# Patient Record
Sex: FEMALE | Race: WHITE | NOT HISPANIC OR LATINO
[De-identification: names, ages, dates, MRNs, and addresses within clinical notes are randomized per-mention and may not be internally consistent; named-entity substitution may affect disease eponyms.]

---

## 2017-01-16 ENCOUNTER — FORM ENCOUNTER (OUTPATIENT)
Age: 47
End: 2017-01-16

## 2017-01-17 ENCOUNTER — OUTPATIENT (OUTPATIENT)
Dept: OUTPATIENT SERVICES | Facility: HOSPITAL | Age: 47
LOS: 1 days | End: 2017-01-17
Payer: MEDICAID

## 2017-01-17 DIAGNOSIS — Z98.89 OTHER SPECIFIED POSTPROCEDURAL STATES: Chronic | ICD-10-CM

## 2017-01-17 PROCEDURE — 72040 X-RAY EXAM NECK SPINE 2-3 VW: CPT

## 2017-01-17 PROCEDURE — 72040 X-RAY EXAM NECK SPINE 2-3 VW: CPT | Mod: 26

## 2019-03-18 ENCOUNTER — APPOINTMENT (OUTPATIENT)
Dept: PHYSICAL MEDICINE AND REHAB | Facility: CLINIC | Age: 49
End: 2019-03-18
Payer: MEDICARE

## 2019-03-18 VITALS — BODY MASS INDEX: 25.01 KG/M2 | WEIGHT: 165 LBS | HEIGHT: 68 IN

## 2019-03-18 PROCEDURE — 20552 NJX 1/MLT TRIGGER POINT 1/2: CPT

## 2019-03-18 PROCEDURE — 99213 OFFICE O/P EST LOW 20 MIN: CPT | Mod: 25

## 2019-03-18 RX ORDER — LAMOTRIGINE 200 MG/1
200 TABLET ORAL
Refills: 0 | Status: ACTIVE | COMMUNITY

## 2019-03-18 RX ORDER — NORETHINDRONE ACETATE AND ETHINYL ESTRADIOL 1.5; 3 MG/1; UG/1
TABLET ORAL
Refills: 0 | Status: ACTIVE | COMMUNITY

## 2019-03-19 NOTE — PROCEDURE
[de-identified] : After informed consent, site was cleaned with alcohol.  The right cervical paraspinals, bilateral trapezius, and bilateral L5 paraspinals were injected with 0.5 ml of 1% Lidocaine in multiple areas of the muscle. Bandage was applied and She left in good condition.\par

## 2019-03-19 NOTE — HISTORY OF PRESENT ILLNESS
[FreeTextEntry1] : Location: right \par Quality: sharp; dull \par Severity: moderate \par Duration: few years \par Timing: recurrent \par Aggravating Factors: lifting \par Alleviating Factors: rest; trigger point injections, acupuncture \par Associated Symptoms: no weakness; no weight loss; no fever; no chills; no change in bowel/bladder habits; no swelling; no redness; no warmth; no ecchymosis; numbness/tingling; radiation down leg (right) \par Prior Studies: MRI\par \par Neck also hurting for years. Pain with movement and working. Numbness in hands even after surgery. RF by Dr. Marr helped.

## 2019-03-19 NOTE — PHYSICAL EXAM
[FreeTextEntry1] : 48-year-old female.\par \par Constitutional: General Appearance: healthy-appearing, NAD, and normal body habitus.\par \par Cardiovascular System: Edema Right: none. Edema Left: none. Varicosities Right: no varicosities. Varicosities Left: no varicosities.\par \par Cervical Spine: Inspection: alignment normal and no muscle atrophy. Soft Tissue Palpation on the Right: TENDERNESS of the paracervicals, the trapezius, rhomboid, no TTP of the levator scapulae. Soft Tissue Palpation on the Left: no tenderness of the paracervicals; TTP  trapezius, rhomboid. Bony Palpation: no tenderness of the occipital protuberance, the transverse process, or the spinous process. Active Range of Motion: rotation to the left (70 deg.) and the right (70 deg.); pain elicited by motion; and flexion normal, extension normal, and no crepitus.\par \par Motor Strength: C5 on the Right: abduction deltoid 5/5. C5 on the Left: abduction deltoid 5/5. C6 on the Right: flexion biceps 5/5. C6 on the Left: flexion biceps 5/5. C7 on the Right: extension triceps 5/5. C7 on the Left: extension triceps 5/5. C8 on the Right: flexion fingers 5/5. C8 on the Left: flexion fingers 5/5. T1 on the Right: abduction fingers 5/5. T1 on the Left: abduction fingers 5/5.\par \par Neurological System: Biceps Reflex Right: absent (0). Biceps Reflex Left: absent (0). Triceps Reflex Right: absent (0). Triceps Reflex Left: absent (0). Sensation on the Right: C5 normal, T1 normal, L2 normal, L3 normal, L4 normal, L5 normal, and S1 normal; decreased in 1-3rd digits bilat. Sensation on the Left: T1 normal, T2 normal, L2 normal, L3 normal, L4 normal, L5 normal, and S1 normal. Ankle Reflex Right: diminished (1). Ankle Reflex Left: diminished (1). Knee Reflex Right: diminished (1). Knee Reflex Left: diminished (1). Special Tests on the Right: seated straight leg raising test negative. Special Tests on the Left: seated straight leg raising test negative.\par \par Skin: Head and Neck: normal. Right Upper Extremity: normal. Left Upper Extremity: normal. Lumbosacral Spine: normal skin. Lower extremity, left: normal. Lower extremity, right: normal.\par \par Psychiatric: Orientation: oriented to time, place, and person. Mood and Affect: normal mood and affect and active and alert.\par \par Gait and Station: Appearance: normal gait, no limp, and ambulating with no assistive devices.\par \par Lumbar Spine: Inspection: no induration, ecchymosis, or swelling and normal alignment. Bony Palpation of the Lumbar Spine: no tenderness of the sacrum. Bony Palpation of the Right Hip: no tenderness of the iliac crest, the PSIS, or the SI joint and no tenderness on the greater trochanter. Bony Palpation of the Left Hip: no tenderness of the iliac crest, the PSIS, the SI joint, or the greater trochanter. Soft Tissue Palpation on the Right: no tenderness of the gluteus reinaldo, or the gluteus medius; TENDERNESS of the paraspinal region at L 5. Soft Tissue Palpation on the Left: no tenderness of the gluteus reinaldo, or the gluteus medius; TENDERNESS of the paraspinal region at L 5. Active Range of Motion: flexion (50 deg.) and pain with motion and extension normal.\par \par Motor Strength: L1 Motor Strength on the Right: hip flexion iliopsoas 5/5. L1 Motor Strength on the Left: hip flexion iliopsoas 5/5. L2-L4 Motor Strength on the Right: knee extension quadriceps 5/5. L2-L4 Motor Strength on the Left: knee extension quadriceps 5/5. L5 Motor Strength on the Right: ankle dorsiflexion tibialis anterior 5/5. L5 Motor Strength on the Left: ankle dorsiflexion tibialis anterior 5/5. S1 Motor Strength on the Right: plantar flexion gastrocnemius 5/5. S1 Motor Strength on the Left: plantar flexion gastrocnemius 5/5.

## 2019-03-26 ENCOUNTER — APPOINTMENT (OUTPATIENT)
Dept: ORTHOPEDIC SURGERY | Facility: CLINIC | Age: 49
End: 2019-03-26
Payer: MEDICARE

## 2019-03-26 PROCEDURE — 99213 OFFICE O/P EST LOW 20 MIN: CPT | Mod: 25

## 2019-03-26 PROCEDURE — 73562 X-RAY EXAM OF KNEE 3: CPT | Mod: RT

## 2019-03-26 PROCEDURE — 20610 DRAIN/INJ JOINT/BURSA W/O US: CPT | Mod: RT

## 2019-03-26 NOTE — PHYSICAL EXAM
[Normal Touch] : sensation intact for touch [Normal] : Oriented to person, place, and time, insight and judgement were intact and the affect was normal [de-identified] : On exam the right knee there is a moderate effusion there is no warmth. There is no skin abnormality. There is tenderness to the medial and lateral patellar facets medial lateral joint line as well as the medial lateral femoral condyle. There is no tenderness to the fibular head. Range of motion actively is 0-110° with pain at endrange of flexion. There is passive range of motion 0-115° with pain at endrange of flexion. [de-identified] : X-ray of right knee. There is no fracture. There is diffuse moderate medial lateral and patellofemoral arthritis.

## 2019-03-26 NOTE — HISTORY OF PRESENT ILLNESS
[de-identified] : PATIENT WAS DOING MARKEDLY BETTER SINCE LAST VISIT. PATIENT HAD FALL AT HOME, FOLLOWED BY PAIN AND EXTREME SWELLING. CANNOT STRAIGHTEN LEG, STAND, WEIGHT BARE, OR WALK. PAIN ON OUTER SIDE AND BACK OF KNEE & BURNING SENSATION. FALL WAS 3 DAYS AGO, PATIENT HAS BEEN RESTING AND ICING LEG EVER SINCE.

## 2019-03-26 NOTE — PROCEDURE
[FreeTextEntry1] : Patient has demonstrated limited relief from NSAIDS, rest, exercises / PT, and after discussion of the risks and benefits, the patient has elected to proceed with a corticosteroid injection into the RIGHT knee(s) via an Anterolateral site.\par Confirmed that the patient does not have history of prior adverse reactions, active, infections, or relevant allergies.   There was no erythema or warmth, and the skin was clear.  The skin was sterilized with alcohol and via sterile technique, the knee was injected 3 cc of 1% xylocaine with 5 mg Kenalog.  The injection was completed without complication and a bandage was applied.  The patient tolerated the procedure well and was given post-injection instructions.\par Apiration of knee performed with 20 cc of serosanginous fluid

## 2019-04-23 ENCOUNTER — OTHER (OUTPATIENT)
Age: 49
End: 2019-04-23

## 2019-04-26 ENCOUNTER — APPOINTMENT (OUTPATIENT)
Dept: PHYSICAL MEDICINE AND REHAB | Facility: CLINIC | Age: 49
End: 2019-04-26
Payer: MEDICARE

## 2019-04-26 VITALS — WEIGHT: 165 LBS | HEIGHT: 68 IN | BODY MASS INDEX: 25.01 KG/M2

## 2019-04-26 DIAGNOSIS — M50.20 OTHER CERVICAL DISC DISPLACEMENT, UNSPECIFIED CERVICAL REGION: ICD-10-CM

## 2019-04-26 DIAGNOSIS — M54.12 RADICULOPATHY, CERVICAL REGION: ICD-10-CM

## 2019-04-26 DIAGNOSIS — M51.26 OTHER INTERVERTEBRAL DISC DISPLACEMENT, LUMBAR REGION: ICD-10-CM

## 2019-04-26 DIAGNOSIS — M79.18 MYALGIA, OTHER SITE: ICD-10-CM

## 2019-04-26 DIAGNOSIS — M48.02 SPINAL STENOSIS, CERVICAL REGION: ICD-10-CM

## 2019-04-26 PROCEDURE — 99213 OFFICE O/P EST LOW 20 MIN: CPT | Mod: 25

## 2019-04-26 PROCEDURE — 20553 NJX 1/MLT TRIGGER POINTS 3/>: CPT

## 2019-04-26 RX ORDER — TRAMADOL HYDROCHLORIDE 50 MG/1
50 TABLET, COATED ORAL
Refills: 0 | Status: DISCONTINUED | COMMUNITY
End: 2019-04-26

## 2019-04-26 NOTE — PHYSICAL EXAM
[FreeTextEntry1] : KIN is a 48 year female \par Constitutional: healthy appearing, NAD, and overweight\par \par LUMBAR\par ROM: flexion to 30 deg, ext to 5 deg, pain with obliq ext\par \par Gait: normal\par \par Inspection: no erythema, warmth\par Spine: no TTP in spinous process, SI joint, sacrum\par Bony palpation: no TTP in GT\par \par Soft tissue palpation hip: no TTP in gluteus reinaldo, medius\par Soft tissue palpation of spine:  TTP in left lumbar paraspinals\par \par 5/5 bilateral HF, KE, DF, PF \par sensation intact in bilat LE\par reflexes: knee and ankle \par \par Special tests: neg seated SLR\par \par \par NECK\par ROM: flexion to 40, ext to 40, rotation to 70 deg bilat\par \par Inspection: no erythema, warmth\par Spine: no TTP in spinous process\par Soft tissue palpation: no TTP in cervical paraspinals, rhomboids, TTP in  trapezius bilat\par \par 5/5 bilateral elb flex/ext, WE, finger abd/flex\par sensation intact in bilat UE\par reflexes:  biceps and triceps\par \par Special tests: neg Spurling, Bolton\par \par

## 2019-04-26 NOTE — HISTORY OF PRESENT ILLNESS
[FreeTextEntry1] : Location: back\par Quality: sharp; dull \par Severity: moderate \par Duration: few years \par Timing: recurrent \par Aggravating Factors: lifting \par Alleviating Factors: rest; trigger point injections, acupuncture, RF ablation\par Associated Symptoms: no weakness; no weight loss; no fever; no chills; no change in bowel/bladder habits; no swelling; no redness; no warmth; no ecchymosis; numbness/tingling; radiation down leg (right) \par Prior Studies: MRI\par \par Neck also hurting for years. Pain with movement and working. Numbness in hands even after surgery. RF by Dr. Marr helped.

## 2019-04-26 NOTE — ASSESSMENT
[FreeTextEntry1] : No swimming or soaking for 1 day.  MRI is medically necessary to further evaluation the condition.  \par \par She  has recently tried the following treatments:\par Activity modification      +\par Ice/Compression           +\par Nsaids                           +\par Home exercise             +\par \par

## 2019-04-26 NOTE — PROCEDURE
[de-identified] : After informed consent,she elected to proceed with a trigger point injection into the left lumbar and thoracic paraspinals, and bilateral trapezius. I confirmed no prior adverse reactions, no active infections, and no relevant allergies. \par  \par The skin was prepped in the usual sterile manner. The muscles were pinched and elevated from the chest wall to avoid puncturing the lung. The sites were injected with local anesthetic followed by local needling. The injection was completed without complication and a bandage was applied. She tolerated the procedure well and was given post-injection instructions. \par  \par Cold Tx x 48 hours, analgesics prn. Medications: 0.5 ml of 1% Lidocaine per site\par \par \par

## 2019-05-03 ENCOUNTER — APPOINTMENT (OUTPATIENT)
Dept: PHYSICAL MEDICINE AND REHAB | Facility: CLINIC | Age: 49
End: 2019-05-03

## 2019-06-03 ENCOUNTER — OTHER (OUTPATIENT)
Age: 49
End: 2019-06-03

## 2019-06-03 RX ORDER — TRAMADOL HYDROCHLORIDE 50 MG/1
50 TABLET, COATED ORAL 4 TIMES DAILY
Qty: 30 | Refills: 0 | Status: ACTIVE | COMMUNITY
Start: 2019-06-03 | End: 1900-01-01

## 2019-06-28 ENCOUNTER — OTHER (OUTPATIENT)
Age: 49
End: 2019-06-28

## 2019-06-28 RX ORDER — TRAMADOL HYDROCHLORIDE 50 MG/1
50 TABLET, COATED ORAL 4 TIMES DAILY
Qty: 25 | Refills: 0 | Status: ACTIVE | COMMUNITY
Start: 2019-06-28 | End: 1900-01-01

## 2019-07-22 ENCOUNTER — OTHER (OUTPATIENT)
Age: 49
End: 2019-07-22

## 2019-07-22 RX ORDER — TRAMADOL HYDROCHLORIDE 50 MG/1
50 TABLET, COATED ORAL 3 TIMES DAILY
Qty: 30 | Refills: 0 | Status: ACTIVE | COMMUNITY
Start: 2019-04-23 | End: 1900-01-01

## 2019-07-24 ENCOUNTER — APPOINTMENT (OUTPATIENT)
Dept: PHYSICAL MEDICINE AND REHAB | Facility: CLINIC | Age: 49
End: 2019-07-24

## 2019-08-20 ENCOUNTER — OTHER (OUTPATIENT)
Age: 49
End: 2019-08-20

## 2019-10-07 ENCOUNTER — FORM ENCOUNTER (OUTPATIENT)
Age: 49
End: 2019-10-07

## 2019-10-08 ENCOUNTER — APPOINTMENT (OUTPATIENT)
Dept: ORTHOPEDIC SURGERY | Facility: CLINIC | Age: 49
End: 2019-10-08
Payer: MEDICARE

## 2019-10-08 ENCOUNTER — OUTPATIENT (OUTPATIENT)
Dept: OUTPATIENT SERVICES | Facility: HOSPITAL | Age: 49
LOS: 1 days | End: 2019-10-08
Payer: MEDICARE

## 2019-10-08 VITALS — BODY MASS INDEX: 22.76 KG/M2 | WEIGHT: 145 LBS | HEIGHT: 67 IN

## 2019-10-08 DIAGNOSIS — Z98.89 OTHER SPECIFIED POSTPROCEDURAL STATES: Chronic | ICD-10-CM

## 2019-10-08 PROCEDURE — 72170 X-RAY EXAM OF PELVIS: CPT | Mod: 26

## 2019-10-08 PROCEDURE — 73564 X-RAY EXAM KNEE 4 OR MORE: CPT | Mod: 26,50

## 2019-10-08 PROCEDURE — 73564 X-RAY EXAM KNEE 4 OR MORE: CPT

## 2019-10-08 PROCEDURE — 72170 X-RAY EXAM OF PELVIS: CPT

## 2019-10-08 PROCEDURE — 99214 OFFICE O/P EST MOD 30 MIN: CPT

## 2019-10-09 NOTE — HISTORY OF PRESENT ILLNESS
[Worsening] : worsening [___ yrs] : [unfilled] year(s) ago [Standing] : standing [Constant] : ~He/She~ states the symptoms seem to be constant [Bending] : worsened by bending [Sitting] : worsened by sitting [Walking] : worsened by walking [None] : No relieving factors are noted [de-identified] : 49 year old F presents today for initial evaluation of bilateral knee pain, left worse than right, that began in 1990. She reports moderate to severe, continual bilateral knee pain along with moderate stiffness and instability on all surfaces. Patient can walk 5-10 blocks with a slight to moderate limp and a cane or occasionally a walker. She ascends stairs with a rail and reports being unable to descend them. 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 Physical therapy, bracing, assistive device use, OTC analgesic medications and Tramadol help a little to relieve pain. She has had steroid injections and reports that she just finished a round of HA injections, which provide moderate pain relief.\par Ms. Larios is s/p surgery for right meniscus tear in 1990, s/p surgery for left meniscus tear in 1991, s/p right ACL repair in 1993, and s/p left meniscus surgery in 2001.\par Of note, patient works as a massage therapist. She had a worker's compensation case because she tore her right MCL at work. [Acetaminophen] : not relieved by acetaminophen [NSAIDs] : not relieved by nonsteroidal anti-inflammatory drugs [Opioid Analgesics] : not relieved by opioid analgesics [Physical Therapy] : not relieved by physical therapy

## 2019-10-09 NOTE — DISCUSSION/SUMMARY
[de-identified] : Ms. Larios presents with bilateral knee pain in the setting of moderate DJD. I informed patient that I will not indicate her for knee replacement because this operation does not reliably improve pain in patients who do not have bone on bone arthritis. I strongly advised against the use of narcotic medication to treat her pain because this can cause addiction and narcotic induced hyperalgesia.\par \par Unfortunately, she has tried essentially all evidence-based treatments for knee DJD pain.\par \par We discussed the existence of a study on the use of a neuromuscular stimulation brace for the treatment of arthritis. I informed patient that there's evidence that this brace helps patients recover faster from knee replacement surgeries and that the company that creates the brace is now sponsoring a research study to determine if it helps to treat knee arthritis. I told patient that enrollment in the study is free but she will have to fill out questionnaires and visit with the research team a few times in order to participate. I also explained that there are two research groups because it's a randomized clinical trial. One of these groups will act as a control and will not receive the neuromuscular stimulation brace, but patients who do not receive the brace during the study will have the opportunity to use it after the study is over. \par \par Patient expressed interest in the study so she was given the opportunity to speak to a research assistant. She will follow up with the research team in about one month, or as dictated by the study.\par \par Follow up in my office PRN.\par

## 2019-10-09 NOTE — END OF VISIT
[FreeTextEntry3] : All medical record entries made by Atilio Lord acting as a scribe for the performing provider (Pancho Anguiano MD and/or SARWAT Villalba) on 10/08/2019. All entries were dictated to me by the performing medical provider. In signing this record, the medical provider affirms that they have personally performed the history, physical exam, assessment and plan and have also directed, reviewed and agreed to the documentation in the chart. [>50% of Time Spent on Counseling for ____] : Greater than 50% of the encounter time was spent on counseling for [unfilled] [Time Spent: ___ minutes] : I have spent [unfilled] minutes of face to face time with the patient

## 2019-10-09 NOTE — PHYSICAL EXAM
[de-identified] : Constitutional: Well appearing. No acute distress.\par Mental Status: Alert & oriented to person, place and time. Normal affect.\par Pulmonary: No respiratory distress. Normal chest excursion.\par \par Gait: Antalgic.\par Ambulatory assist devices: None.\par \par Cervical spine: Skin intact. No visible deformity. Painless active ROM without evident restriction.\par Bilateral upper extremities: Skin intact. No deformity. Painless active ROM without evident restriction.\par Thoracolumbar spine: No deformity. No tenderness. No radicular pain on passive straight leg raise bilaterally.\par \par Pelvis: No pelvic obliquity. No tenderness.\par Leg lengths: Equal.\par \par Bilateral Hips: No swelling or deformity. Painless and unrestricted range of motion. No crepitation. \par \par Right Knee:\par Skin intact. (+) oblique scar extended from medial edge of tibial tubercle across the patella and to lateral aspect of knee along IT band. No erythema or ecchymosis. No swelling or effusion. No deformity. No focal tenderness.\par Painful ROM from near full extension to 125 degrees of flexion.\par Central patellar tracking. No crepitation. No instability.\par \par Left Knee:\par Skin intact. No surgical scars. No erythema or ecchymosis. No swelling or effusion. No deformity. No focal tenderness.\par Painful ROM from full extension to 135 degrees of flexion.\par Central patellar tracking. No crepitation. No instability.\par \par Neurological: Intact distal crude touch sensation. Normal distal motor power. \par Cardiovascular: Palpable dorsalis pedis and posterior tibialis pulses. Brisk capillary refill. No peripheral edema.\par Lymphatics: No peripheral adenopathy appreciated. [de-identified] : MRI imaging of the right knee done at [facility] on 1/25/19 demonstrates:\par Impression:\par 1. Tricompartmental arthrosis, moderate in the medial compartment, mild to moderate lateral and patellofemoral compartments.\par 2. Patient s/p ACL reconstruction in the past using the distal ITB for the graft. The intra-articular component of the graft appears thinned and stretched, but the appearance is stable since prior study from 2011.\par 3. Old healed sprain of the proximal MCL.\par 4. Patient apparently s/p partial medial meniscectomy in the past, with attenuation of the posterior horn and body of the meniscus.\par 5. Small joint effusion, small popliteal cyst.\par \par MRI of the left knee done at [facility] on 1/25/19 demonstrates:\par Impression:\par -Displaced partial bucket-handle type tear of the lateral meniscus with a flipped fragment along the superior margin of the anterior horn.\par -ACL sprain and chronic MCL sprain\par -Moderate grade tricompartmental chondromalacia with mild OA. No fracture.\par -Popliteus tendinosis.\par -Quadriceps and patellar tendinosis.\par -Small knee effusion.\par \par X-ray imaging of the bilateral knees done here today demonstrates moderate joint space narrowing

## 2020-10-19 ENCOUNTER — TRANSCRIPTION ENCOUNTER (OUTPATIENT)
Age: 50
End: 2020-10-19

## 2020-12-01 ENCOUNTER — APPOINTMENT (OUTPATIENT)
Dept: ORTHOPEDIC SURGERY | Facility: CLINIC | Age: 50
End: 2020-12-01
Payer: MEDICARE

## 2020-12-01 VITALS — BODY MASS INDEX: 22.76 KG/M2 | WEIGHT: 145 LBS | HEIGHT: 67 IN

## 2020-12-01 DIAGNOSIS — M17.10 UNILATERAL PRIMARY OSTEOARTHRITIS, UNSPECIFIED KNEE: ICD-10-CM

## 2020-12-01 PROCEDURE — 99213 OFFICE O/P EST LOW 20 MIN: CPT

## 2020-12-01 RX ORDER — CELECOXIB 100 MG/1
100 CAPSULE ORAL TWICE DAILY
Qty: 60 | Refills: 1 | Status: ACTIVE | COMMUNITY
Start: 2020-12-01 | End: 1900-01-01

## 2020-12-01 NOTE — HISTORY OF PRESENT ILLNESS
[de-identified] : Patient is an established patient seen approximately 2 years ago with a history of several years of knee arthritis.  Patient had to switch to another provider secondary to insurance issues at that time.  States persistent intermittent bilateral knee pain.  States viscosupplementation in March.

## 2020-12-01 NOTE — PHYSICAL EXAM
[de-identified] : Bilateral knee\par \par Constitutional: \par The patient is healthy-appearing and in no apparent distress. \par \par Gait:\par The patient ambulates with a normal gait and no limp.\par \par Cardiovascular System: \par The capillary refill is less than 2 seconds. \par \par Skin: \par There are no skin abnormalities.\par \par Right Knee:\par  \par Bony Palpation: \par There is tenderness of the medial joint line. \par There is no tenderness of the lateral joint line.\par There is tenderness of the medial femoral chondyle.\par There is no tenderness of the lateral femoral chondyle.\par There is no tenderness of the tibial tubercle.\par There is no tenderness of the superior patella.\par There is no tenderness of the inferior patella.\par There is tenderness of the medial patellar facet.\par There is tenderness of the lateral patellar facet.\par \par Soft Tissue Palpation: \par There is tenderness of the medial retinaculum.\par There is tenderness of the lateral retinaculum.\par There is no tenderness of the quadriceps tendon.\par There is no tenderness of the patella tendon.\par There is no tenderness of the ITB.\par There is no tenderness of the pes anserine.\par \par Active Range of Motion: \par The range of motion at the knee actively and passively is 3 -125. \par \par Special Tests: \par There is a negative Apley.\par There is a negative Steinmanns. \par There is a negative Lachman and Anterior Drawer.\par There is a negative Posterior Drawer.  \par There is no varus or valgus laxity.\par \par Strength: \par There is 5/5 hip flexion and 5/5 knee flexion and extension.  \par \par Left Knee:\par  \par Bony Palpation: \par There is tenderness of the medial joint line. \par There is tenderness of the lateral joint line.\par There is tenderness of the medial femoral chondyle.\par There is no tenderness of the lateral femoral chondyle.\par There is no tenderness of the tibial tubercle.\par There is no tenderness of the superior patella.\par There is no tenderness of the inferior patella.\par There is tenderness of the medial patellar facet.\par There is tenderness of the lateral patellar facet.\par \par Soft Tissue Palpation: \par There is tenderness of the medial retinaculum.\par There is tenderness of the lateral retinaculum.\par There is no tenderness of the quadriceps tendon.\par There is no tenderness of the patella tendon.\par There is no tenderness of the ITB.\par There is no tenderness of the pes anserine.\par \par Active Range of Motion: \par The range of motion at the knee actively and passively is 3 - 125. \par \par Special Tests: \par There is a negative Apley.\par There is a negative Steinmanns. \par There is a negative Lachman and Anterior Drawer.\par There is a negative Posterior Drawer.  \par There is no varus or valgus laxity.\par \par Strength: \par There is 5/5 hip flexion and 5/5 knee flexion and extension.  \par \par Psychiatric: \par The patient demonstrates a normal mood and affect and is active and alert\par \par  [de-identified] : MRI bilateral knees: moderate tricompartmental DJD with LEFT knee lateral and medial meniscal tears

## 2020-12-01 NOTE — ASSESSMENT
[FreeTextEntry1] : Discussed at length with patient exam history and current imaging as well as treatment options.  He still is to continue physical therapy and observation at this time\par

## 2021-01-11 ENCOUNTER — TRANSCRIPTION ENCOUNTER (OUTPATIENT)
Age: 51
End: 2021-01-11

## 2021-05-11 ENCOUNTER — RX RENEWAL (OUTPATIENT)
Age: 51
End: 2021-05-11

## 2021-05-11 RX ORDER — TRAMADOL HYDROCHLORIDE 50 MG/1
50 TABLET, COATED ORAL
Qty: 30 | Refills: 0 | Status: ACTIVE | COMMUNITY
Start: 2020-12-01 | End: 1900-01-01